# Patient Record
Sex: FEMALE | Race: WHITE | Employment: UNEMPLOYED | URBAN - METROPOLITAN AREA
[De-identification: names, ages, dates, MRNs, and addresses within clinical notes are randomized per-mention and may not be internally consistent; named-entity substitution may affect disease eponyms.]

---

## 2019-12-23 PROBLEM — Z00.00 PREVENTATIVE HEALTH CARE: Status: ACTIVE | Noted: 2019-12-23

## 2019-12-23 PROBLEM — Z13.1 SCREENING FOR DIABETES MELLITUS: Status: ACTIVE | Noted: 2019-12-23

## 2019-12-23 PROBLEM — Z13.220 SCREENING CHOLESTEROL LEVEL: Status: ACTIVE | Noted: 2019-12-23

## 2019-12-23 PROBLEM — Z00.00 ANNUAL PHYSICAL EXAM: Status: ACTIVE | Noted: 2019-12-23

## 2019-12-23 PROBLEM — E66.3 OVERWEIGHT (BMI 25.0-29.9): Status: ACTIVE | Noted: 2019-12-23

## 2019-12-23 PROBLEM — Z13.228 SCREENING FOR METABOLIC DISORDER: Status: ACTIVE | Noted: 2019-12-23

## 2020-09-23 PROBLEM — Z13.1 SCREENING FOR DIABETES MELLITUS: Status: RESOLVED | Noted: 2019-12-23 | Resolved: 2020-09-23

## 2020-09-23 PROBLEM — Z13.220 SCREENING CHOLESTEROL LEVEL: Status: RESOLVED | Noted: 2019-12-23 | Resolved: 2020-09-23

## 2020-09-23 PROBLEM — Z00.00 ANNUAL PHYSICAL EXAM: Status: RESOLVED | Noted: 2019-12-23 | Resolved: 2020-09-23

## 2020-09-23 PROBLEM — Z13.228 SCREENING FOR METABOLIC DISORDER: Status: RESOLVED | Noted: 2019-12-23 | Resolved: 2020-09-23

## 2020-09-23 PROBLEM — Z00.00 PREVENTATIVE HEALTH CARE: Status: RESOLVED | Noted: 2019-12-23 | Resolved: 2020-09-23

## 2020-11-28 ENCOUNTER — HOSPITAL ENCOUNTER (INPATIENT)
Facility: HOSPITAL | Age: 30
LOS: 1 days | Discharge: HOME | End: 2020-11-29
Attending: OBSTETRICS & GYNECOLOGY | Admitting: OBSTETRICS & GYNECOLOGY
Payer: COMMERCIAL

## 2020-11-28 PROBLEM — Z34.90 PREGNANCY: Status: ACTIVE | Noted: 2020-11-28

## 2020-11-28 PROCEDURE — 0KQM0ZZ REPAIR PERINEUM MUSCLE, OPEN APPROACH: ICD-10-PCS | Performed by: OBSTETRICS & GYNECOLOGY

## 2020-11-28 PROCEDURE — 86780 TREPONEMA PALLIDUM: CPT | Performed by: OBSTETRICS & GYNECOLOGY

## 2020-11-28 PROCEDURE — 85025 COMPLETE CBC W/AUTO DIFF WBC: CPT | Performed by: OBSTETRICS & GYNECOLOGY

## 2020-11-28 PROCEDURE — 86850 RBC ANTIBODY SCREEN: CPT | Performed by: OBSTETRICS & GYNECOLOGY

## 2020-11-28 PROCEDURE — 86901 BLOOD TYPING SEROLOGIC RH(D): CPT | Performed by: OBSTETRICS & GYNECOLOGY

## 2020-11-28 PROCEDURE — 86900 BLOOD TYPING SEROLOGIC ABO: CPT | Performed by: OBSTETRICS & GYNECOLOGY

## 2020-11-28 PROCEDURE — 99204 OFFICE O/P NEW MOD 45 MIN: CPT

## 2020-11-28 RX ORDER — IBUPROFEN 600 MG/1
600 TABLET ORAL EVERY 6 HOURS PRN
Status: DISCONTINUED | OUTPATIENT
Start: 2020-11-28 | End: 2020-11-29 | Stop reason: HOSPADM

## 2020-11-28 RX ORDER — DIPHENHYDRAMINE HYDROCHLORIDE 50 MG/ML
12.5 INJECTION INTRAMUSCULAR; INTRAVENOUS EVERY 4 HOURS PRN
Status: DISCONTINUED | OUTPATIENT
Start: 2020-11-28 | End: 2020-11-29

## 2020-11-28 RX ORDER — AMMONIA INHALANTS 0.04 G/.3ML
0.3 INHALANT RESPIRATORY (INHALATION) AS NEEDED
Status: DISCONTINUED | OUTPATIENT
Start: 2020-11-28 | End: 2020-11-29 | Stop reason: HOSPADM

## 2020-11-28 RX ORDER — DEXTROSE, SODIUM CHLORIDE, SODIUM LACTATE, POTASSIUM CHLORIDE, AND CALCIUM CHLORIDE 5; .6; .31; .03; .02 G/100ML; G/100ML; G/100ML; G/100ML; G/100ML
INJECTION, SOLUTION INTRAVENOUS AS NEEDED
Status: DISCONTINUED | OUTPATIENT
Start: 2020-11-28 | End: 2020-11-29 | Stop reason: HOSPADM

## 2020-11-28 RX ORDER — TRISODIUM CITRATE DIHYDRATE AND CITRIC ACID MONOHYDRATE 500; 334 MG/5ML; MG/5ML
30 SOLUTION ORAL AS NEEDED
Status: DISCONTINUED | OUTPATIENT
Start: 2020-11-28 | End: 2020-11-29 | Stop reason: HOSPADM

## 2020-11-28 RX ORDER — ACETAMINOPHEN 325 MG/1
650 TABLET ORAL EVERY 6 HOURS PRN
Status: DISCONTINUED | OUTPATIENT
Start: 2020-11-28 | End: 2020-11-30

## 2020-11-28 RX ORDER — SODIUM CHLORIDE, SODIUM LACTATE, POTASSIUM CHLORIDE, CALCIUM CHLORIDE 600; 310; 30; 20 MG/100ML; MG/100ML; MG/100ML; MG/100ML
INJECTION, SOLUTION INTRAVENOUS CONTINUOUS
Status: DISCONTINUED | OUTPATIENT
Start: 2020-11-28 | End: 2020-11-29 | Stop reason: HOSPADM

## 2020-11-28 RX ORDER — IBUPROFEN 600 MG/1
600 TABLET ORAL EVERY 6 HOURS
Status: DISCONTINUED | OUTPATIENT
Start: 2020-11-28 | End: 2020-11-30

## 2020-11-28 RX ORDER — ACETAMINOPHEN 500 MG
500 TABLET ORAL EVERY 6 HOURS PRN
Status: DISCONTINUED | OUTPATIENT
Start: 2020-11-28 | End: 2020-11-28 | Stop reason: ALTCHOICE

## 2020-11-28 RX ORDER — SIMETHICONE 80 MG
80 TABLET,CHEWABLE ORAL 3 TIMES DAILY PRN
Status: DISCONTINUED | OUTPATIENT
Start: 2020-11-28 | End: 2020-11-30

## 2020-11-28 RX ORDER — BISACODYL 10 MG
10 SUPPOSITORY, RECTAL RECTAL ONCE AS NEEDED
Status: DISCONTINUED | OUTPATIENT
Start: 2020-11-28 | End: 2020-11-30

## 2020-11-28 RX ORDER — DOCUSATE SODIUM 100 MG/1
100 CAPSULE, LIQUID FILLED ORAL
Status: DISCONTINUED | OUTPATIENT
Start: 2020-11-28 | End: 2020-11-30

## 2020-11-28 RX ORDER — ONDANSETRON 2 MG/ML
4 INJECTION INTRAMUSCULAR; INTRAVENOUS EVERY 6 HOURS PRN
Status: DISCONTINUED | OUTPATIENT
Start: 2020-11-28 | End: 2020-11-29 | Stop reason: HOSPADM

## 2020-11-28 RX ORDER — BUPIVACAINE HCL/0.9 % NACL/PF 0.25 %
5 PLASTIC BAG, INJECTION (ML) EPIDURAL AS NEEDED
Status: DISCONTINUED | OUTPATIENT
Start: 2020-11-28 | End: 2020-11-29

## 2020-11-28 RX ORDER — TERBUTALINE SULFATE 1 MG/ML
0.25 INJECTION, SOLUTION SUBCUTANEOUS AS NEEDED
Status: DISCONTINUED | OUTPATIENT
Start: 2020-11-28 | End: 2020-11-29 | Stop reason: HOSPADM

## 2020-11-29 VITALS
WEIGHT: 190 LBS | HEART RATE: 72 BPM | TEMPERATURE: 98 F | RESPIRATION RATE: 16 BRPM | HEIGHT: 65 IN | DIASTOLIC BLOOD PRESSURE: 72 MMHG | BODY MASS INDEX: 31.65 KG/M2 | SYSTOLIC BLOOD PRESSURE: 108 MMHG

## 2020-11-29 PROCEDURE — 85025 COMPLETE CBC W/AUTO DIFF WBC: CPT | Performed by: OBSTETRICS & GYNECOLOGY

## 2020-11-29 NOTE — PROGRESS NOTES
Pt is a 27year old female admitted to TRG5/TRG5-A. Pt is  38w3d intra-uterine pregnancy.     Patient presents with:  R/o Labor: Pt c/o regular and painful UCs beginning today around 1430  Pt also c/o questionable leaking of fluid beginning today ar

## 2020-11-29 NOTE — PLAN OF CARE
Problem: BIRTH - VAGINAL/ SECTION  Goal: Fetal and maternal status remain reassuring during the birth process  Description: INTERVENTIONS:  - Monitor vital signs  - Monitor fetal heart rate  - Monitor uterine activity  - Monitor labor progression the birth process  Description: INTERVENTIONS:  - Monitor vital signs  - Monitor fetal heart rate  - Monitor uterine activity  - Monitor labor progression (vaginal delivery)  - DVT prophylaxis (C/S delivery)  - Surgical antibiotic prophylaxis (C/S delivery

## 2020-11-29 NOTE — PLAN OF CARE
NURSING ADMISSION NOTE      Patient admitted via Wheelchair accompanied by L&D RN and OBT  Oriented to room. Safety precautions initiated. Hugs and kisses verified. Bed in low position. Call light in reach. POC, MD orders and DC planning reviewed w/ express feelings/concerns, and actively listen. - Educate father/SO about benefits of breast feeding and how to manage common lactation challenges.   - Recommend avoidance of specific medications or substances incompatible with breast feeding.  - Assess an

## 2020-11-29 NOTE — PROGRESS NOTES
Postpartum Day 1    Pt without complaints.     Temp: 98.2 °F (36.8 °C)  Pulse: 69  Resp: 16  BP: 105/65  abd  soft, NT, ND, fundus firm below umbilicus  perineum NL lochia  extr  trace edema, no calf tenderness  Recent Labs   Lab 11/29/20  0651   RBC 4.07

## 2020-11-29 NOTE — PROGRESS NOTES
Received report and assuming care of the pt. Pt in the bed that is in the low locked position. poc explained to the pt. Pt in agreement. covid oswab obtained. Consent explained and then signed. efm off- pt moved [place] [id number] ambulatory for admission.

## 2020-11-29 NOTE — H&P
[address] Patient Status:  Inpatient    1990 MRN [id number]   Location [address] [facility] Attending Jm Morgan MD   Hosp Day # 0 PCP Mallory Madison MD     SUBJECTIVE:    T Fetal Surveillance:  135s, reactive  q 2-3      Cervix: 7/80/-2     Lab Review:    No visits with results within 1 Day(s) from this visit.    Latest known visit with results is:   Routine Prenatal on 11/25/2020   Component Date Value   • Glucose Urine 11/

## 2020-11-29 NOTE — PROGRESS NOTES
Recovery completed. Pt assisted to the br, voided, pericare done.   Pt to w/c for trasnfer to m/b with baby in arms in stable cond

## 2020-11-29 NOTE — L&D DELIVERY NOTE
Keiko, Girl [[id number]]    Labor Events     steroids?: None  Antibiotics received during labor?: No  Antibiotics (enter # doses in comment): none  Rupture date/time: 2020 194     Rupture type: AROM, Bulging  Fluid color: Clear  Induction: Weston Kay  [place] disposition: with mother     Skin to Skin    No data filed      Vaginal Count    Initial count RN: Emily Portillo RN  Initial count Tech: Hollis Menjivar    Initial counts 11   0    Final counts

## 2020-11-29 NOTE — PLAN OF CARE
Problem: BIRTH - VAGINAL/ SECTION  Goal: Fetal and maternal status remain reassuring during the birth process  Description: INTERVENTIONS:  - Monitor vital signs  - Monitor fetal heart rate  - Monitor uterine activity  - Monitor labor progression Latha García, RN  Outcome: Completed  11/28/2020 1921 by Courtney Coronel, RN  Outcome: Progressing  Goal: Patient/Family Short Term Goal  Description: Patient's Short Term Goal: effective pain management  Interventions:   - See additional Care Plan goals for spe

## 2020-11-30 NOTE — PROGRESS NOTES
NURSING DISCHARGE NOTE    Discharged Home via Wheelchair. Accompanied by Support staff  Belongings Taken by patient/family. DC instructions printed, reviewed and given to. All questions answered.

## 2020-12-03 ENCOUNTER — TELEPHONE (OUTPATIENT)
Dept: OBGYN UNIT | Facility: HOSPITAL | Age: 30
End: 2020-12-03

## 2020-12-03 NOTE — PROGRESS NOTES
Unable to reach patient through telephone, no voicemail system set up. Will send letter through [employer].

## (undated) NOTE — LETTER
Dear new mom:    We've missed you! The nurses of [facility] have tried to reach you by phone to ask if you had any questions regarding your health or the care of your new little one.     Please feel free to call your doctor with an